# Patient Record
Sex: FEMALE | Race: WHITE | NOT HISPANIC OR LATINO | Employment: FULL TIME | URBAN - METROPOLITAN AREA
[De-identification: names, ages, dates, MRNs, and addresses within clinical notes are randomized per-mention and may not be internally consistent; named-entity substitution may affect disease eponyms.]

---

## 2017-02-17 ENCOUNTER — GENERIC CONVERSION - ENCOUNTER (OUTPATIENT)
Dept: OTHER | Facility: OTHER | Age: 22
End: 2017-02-17

## 2017-03-09 ENCOUNTER — ALLSCRIPTS OFFICE VISIT (OUTPATIENT)
Dept: OTHER | Facility: OTHER | Age: 22
End: 2017-03-09

## 2017-03-09 LAB
BACTERIA UR QL AUTO: NORMAL
CLUE CELL (HISTORICAL): POSITIVE
HYPHAL YEAST (HISTORICAL): NORMAL
KOH PREP (HISTORICAL): NORMAL
PH UR STRIP.AUTO: 5 [PH]
TRICHOMONAS (HISTORICAL): NEGATIVE

## 2017-03-20 ENCOUNTER — GENERIC CONVERSION - ENCOUNTER (OUTPATIENT)
Dept: OTHER | Facility: OTHER | Age: 22
End: 2017-03-20

## 2017-03-29 ENCOUNTER — LAB CONVERSION - ENCOUNTER (OUTPATIENT)
Dept: OTHER | Facility: OTHER | Age: 22
End: 2017-03-29

## 2017-03-29 LAB
DEPRECATED RDW RBC AUTO: 14.1 % (ref 11–15)
HCG QUANTITATIVE (HISTORICAL): <2 MIU/ML
HCT VFR BLD AUTO: 41.7 % (ref 35–45)
HEPATITIS A IGM ANTIBODY (HISTORICAL): NORMAL
HEPATITIS B CORE TOTAL ANTIBODY (HISTORICAL): NORMAL
HEPATITIS B SURFACE ANTIGEN (HISTORICAL): NORMAL
HEPATITIS C ANTIBODY (HISTORICAL): NORMAL
HGB BLD-MCNC: 13.8 G/DL (ref 11.7–15.5)
HIV AG/AB, 4TH GEN (HISTORICAL): NORMAL
MCH RBC QN AUTO: 26.8 PG (ref 27–33)
MCHC RBC AUTO-ENTMCNC: 33.1 G/DL (ref 32–36)
MCV RBC AUTO: 81.1 FL (ref 80–100)
PLATELET # BLD AUTO: 287 THOUSAND/UL (ref 140–400)
PMV BLD AUTO: 8.6 FL (ref 7.5–12.5)
RBC # BLD AUTO: 5.15 MILLION/UL (ref 3.8–5.1)
RPR SCREEN (HISTORICAL): NORMAL
SIGNAL TO CUT-OFF (HISTORICAL): 0.01
TSH SERPL DL<=0.05 MIU/L-ACNC: 0.66 MIU/L
WBC # BLD AUTO: 9.3 THOUSAND/UL (ref 3.8–10.8)

## 2017-04-04 ENCOUNTER — GENERIC CONVERSION - ENCOUNTER (OUTPATIENT)
Dept: OTHER | Facility: OTHER | Age: 22
End: 2017-04-04

## 2017-10-12 ENCOUNTER — ALLSCRIPTS OFFICE VISIT (OUTPATIENT)
Dept: OTHER | Facility: OTHER | Age: 22
End: 2017-10-12

## 2017-10-17 LAB — CLINICAL COMMENT (HISTORICAL): NORMAL

## 2017-11-06 ENCOUNTER — OFFICE VISIT (OUTPATIENT)
Dept: URGENT CARE | Facility: CLINIC | Age: 22
End: 2017-11-06
Payer: COMMERCIAL

## 2017-11-07 NOTE — PROGRESS NOTES
Chief Complaint  Chief Complaint Free Text Note Form: Patient is here for a work physical       History of Present Illness  Hospital Based Practices Required Assessment:   Pain Assessment   the patient states they do not have pain  Abuse And Domestic Violence Screen    Yes, the patient is safe at home  -- The patient states no one is hurting them  Depression And Suicide Screen  No, the patient has not had thoughts of hurting themself  No, the patient has not felt depressed in the past 7 days  Active Problems  1  Cervical cancer screening (V76 2) (Z12 4)   2  Contraception (V25 9) (Z30 9)   3  Encounter for routine gynecological examination with Papanicolaou smear of cervix   (V72 31,V76 2) (Z01 419)   4  Irregular bleeding (626 4) (N92 6)   5  Nexplanon in place (V45 52) (Z97 5)   6  Screening for STDs (sexually transmitted diseases) (V74 5) (Z11 3)    Past Medical History  1  History of Bacterial vaginosis (616 10,041 9) (N76 0,B96 89)   2  History of Birth control counseling (V25 09) (Z30 09)   3  History of Denial (799 29) (R45 89)   4  History of  0 (V49 89)   5  History of streptococcal pharyngitis (V12 09) (Z87 09)   6  History of Low grade squamous intraepithelial lesion (LGSIL) on cervical Pap smear   (795 03) (R87 612)   7  History of Nexplanon insertion (V25 5) (Z30 017)   8  History of Pap smear for cervical cancer screening (V76 2) (Z12 4)   9  History of Potential exposure to STD (V01 6) (Z20 2)   10  History of Pre-employment examination (V70 5) (Z02 1)   11  History of Sore throat (462) (J02 9)   12  History of Vaginal candidiasis (112 1) (B37 3)    Family History  Mother    1  No pertinent family history  Father    2  Family history of cardiac disorder (V17 49) (Z82 49)   3  Family history of malignant neoplasm (V16 9) (Z80 9)   4  Family history of ovarian cyst (V18 7) (Z84 2)  Sister    5  Family history of malignant neoplasm (V16 9) (Z80 9)   6   Family history of ovarian cyst (V18 7) (Z84 2)  Grandparent    7  Family history of malignant neoplasm (V16 9) (Z80 9)    Social History   · Always uses seat belt   · Caffeine use (V49 89) (F15 90)   · Condom use   · Denied: History of Finding of    · Never smoker   · Nexplanon in place (V45 52) (Z97 5)   · No alcohol use   · No drug use   · Single    Surgical History  1  History of Colposcopy Cervix With Biopsy(S) With Endocervical Curettage    Current Meds   1  Nexplanon 68 MG Subcutaneous Implant; Therapy: (Recorded:19Fnf6637) to Recorded    Allergies  1  No Known Drug Allergies  2   Pollen    Vitals  Signs   Recorded: 12VMO7272 12:53PM   Temperature: 99 F  Heart Rate: 98  Respiration: 18  Systolic: 603  Diastolic: 76  Height: 5 ft 3 in  Weight: 204 lb   BMI Calculated: 36 14  BSA Calculated: 1 95  O2 Saturation: 97  Pain Scale: 0    Signatures   Electronically signed by : TOMA Hansen ; 2017 12:56PM EST                       (Author)

## 2018-01-09 NOTE — MISCELLANEOUS
Message   Recorded as Task   Date: 08/26/2016 08:30 AM, Created By: Kevin Cotton   Task Name: Miscellaneous   Assigned To: Yoko Cohen   Regarding Patient: Philippe Lora, Status: Active   Comment:    Mk Bañuelos - 26 Aug 2016 8:30 AM     TASK CREATED  Patient changed her mind and no longer wishes Mirena IUD  She wishes to have Nexplanon for contraception  Please check for insurance company coverage and please arrange for insertion in the near future  The patient is not sexually active, so we could insert Nexplanon at any time  Would check pregnancy test at the time of insertion and encouraged condom use should she become sexually active   [facility] - 26 Aug 2016 9:24 AM     TASK EDITED                 Spoke to patient, she wishes to proceed with Nexplanon placement  Appointment given for 9/14 with Dr Opal Simpson    1  Birth control counseling (V25 09) (Z30 9)   2  Encounter for routine gynecological examination with Papanicolaou smear of cervix   (V72 31,V76 2) (Z01 419)   3  Low grade squamous intraepithelial lesion (LGSIL) on cervical Pap smear (795 03)   (Y05 992)   4  Pap smear for cervical cancer screening (V76 2) (Z12 4)   5  Potential exposure to STD (V01 6) (Z20 2)   6  Pre-employment examination (V70 5) (Z02 1)   7  Sore throat (462) (J02 9)   8  Strep throat (034 0) (J02 0)    Allergies    1  No Known Drug Allergies    2  Pollen    Signatures   Electronically signed by :  Flores Shoemaker, ; Aug 26 2016  9:26AM EST                       (Author)

## 2018-01-10 NOTE — MISCELLANEOUS
Message   Recorded as Task   Date: 03/17/2017 03:29 PM, Created By: Leslie Ayala   Task Name: Miscellaneous   Assigned To: Ankit Almanzar   Regarding Patient: Vandana Flynn, Status: In Progress   SummerNelli Gautam - 17 Mar 2017 3:29 PM     TASK CREATED  GC/Chlamydia negative, please inform the patient  Joanne Jj - 20 Mar 2017 8:43 AM     TASK IN PROGRESS   Joanne Jj - 20 Mar 2017 8:48 AM     TASK EDITED  pt informed        Active Problems    1  Bacterial vaginosis (616 10,041 9) (N76 0,B96 89)   2  Contraception (V25 9) (Z30 9)   3  Irregular bleeding (626 4) (N92 6)   4  Nexplanon in place (V45 52) (Z97 5)   5  Vaginal candidiasis (112 1) (B37 3)    Current Meds   1  Fluconazole 150 MG Oral Tablet; TAKE 1 TABLET Now and 1 tablet in one week; Therapy: 52KQE3958 to (Evaluate:11Mar2017)  Requested for: 03GZE9806; Last   Rx:09Mar2017 Ordered   2  MetroNIDAZOLE 500 MG Oral Tablet; TAKE 1 TABLET TWICE DAILY UNTIL FINISHED; Therapy: 03ZPS7174 to (Evaluate:16Mar2017)  Requested for: 64OLU1114; Last   Rx:09Mar2017 Ordered   3  Nexplanon 68 MG Subcutaneous Implant; Therapy: (Recorded:07Gau6260) to Recorded    Allergies    1  No Known Drug Allergies    2   Pollen    Signatures   Electronically signed by : Fernando Vo, ; Mar 20 2017  8:49AM EST                       (Author)

## 2018-01-10 NOTE — MISCELLANEOUS
Message   Recorded as Task   Date: 08/09/2016 06:54 PM, Created By: Donovan Montoya   Task Name: Go to Result   Assigned To: Sarah Mcconnell   Regarding Patient: Marcelle Sweeney, Status: In Progress   CommentWinston Salguero - 09 Aug 2016 6:54 PM     TASK CREATED  Colposcopy with benign findings, please inform the patient  Recommend return in the near future for Mirena IUD insertion as planned   Joanne Jj - 10 Aug 2016 9:11 AM     TASK IN PROGRESS        Active Problems    1  Birth control counseling (V25 09) (Z30 9)   2  Encounter for routine gynecological examination with Papanicolaou smear of cervix   (V72 31,V76 2) (Z01 419)   3  Low grade squamous intraepithelial lesion (LGSIL) on cervical Pap smear (795 03)   (E45 104)   4  Pap smear for cervical cancer screening (V76 2) (Z12 4)   5  Potential exposure to STD (V01 6) (Z20 2)   6  Pre-employment examination (V70 5) (Z02 1)   7  Sore throat (462) (J02 9)   8  Strep throat (034 0) (J02 0)    Allergies    1  No Known Drug Allergies    2   Pollen    Signatures   Electronically signed by : Johnnie Woodson, ; Aug 10 2016  9:18AM EST                       (Author)

## 2018-01-10 NOTE — MISCELLANEOUS
Message   Recorded as Task   Date: 08/22/2016 02:41 PM, Created By: [facility] [facility]   Task Name: Medical Complaint Callback   Assigned To: [facility]   Regarding Patient: Sandre Gaucher, Status: Active   CommentTracenghia Carroll - 22 Aug 2016 2:41 PM     TASK CREATED  Patient called she wishes to proceed with iud insertion  SHe just got her menses today  Appointment given for this friday with Dr Raisa Delong    1  Birth control counseling (V25 09) (Z30 9)   2  Encounter for routine gynecological examination with Papanicolaou smear of cervix   (V72 31,V76 2) (Z01 419)   3  Low grade squamous intraepithelial lesion (LGSIL) on cervical Pap smear (795 03)   (D63 509)   4  Pap smear for cervical cancer screening (V76 2) (Z12 4)   5  Potential exposure to STD (V01 6) (Z20 2)   6  Pre-employment examination (V70 5) (Z02 1)   7  Sore throat (462) (J02 9)   8  Strep throat (034 0) (J02 0)    Allergies    1  No Known Drug Allergies    2  Pollen    Signatures   Electronically signed by :  Mandeep Morel, ; Aug 22 2016  2:41PM EST                       (Author)

## 2018-01-12 NOTE — MISCELLANEOUS
Message   Date: 17 Feb 2017 9:32 AM EST, Recorded By: Jose Daniel Robison For: Vicky Kendall   Caller: Amado Caballero, Self   Phone: [phone] (Home)   Reason: Medical Complaint   pt is having issues with her nexplanon    she has been having brownish discharge for the past 3 months    pt may want it removed    pt will schedule appt           Active Problems    1  Birth control counseling (V25 09) (Z30 9)   2  Contraception (V25 9) (Z30 9)   3  Encounter for routine gynecological examination with Papanicolaou smear of cervix   (V72 31,V76 2) (Z01 419)   4  Irregular bleeding (626 4) (N92 6)   5  Low grade squamous intraepithelial lesion (LGSIL) on cervical Pap smear (795 03)   (F38 119)   6  Nexplanon in place (V45 52) (Z97 5)   7  Pap smear for cervical cancer screening (V76 2) (Z12 4)   8  Potential exposure to STD (V01 6) (Z20 2)   9  Pre-employment examination (V70 5) (Z02 1)   10  Sore throat (462) (J02 9)   11  Strep throat (034 0) (J02 0)    Current Meds   1  Nexplanon 68 MG Subcutaneous Implant; Therapy: (Recorded:84Fef4151) to Recorded    Allergies    1  No Known Drug Allergies    2   Pollen    Signatures   Electronically signed by : Gamaliel Rogel, ; Feb 17 2017  9:35AM EST                       (Author)

## 2018-01-13 VITALS
WEIGHT: 196.25 LBS | BODY MASS INDEX: 34.77 KG/M2 | DIASTOLIC BLOOD PRESSURE: 78 MMHG | HEIGHT: 63 IN | SYSTOLIC BLOOD PRESSURE: 118 MMHG

## 2018-01-13 NOTE — PROGRESS NOTES
Assessment    1  Contraception (V25 9) (Z30 9)   2  Irregular bleeding (626 4) (N92 6)   3  Bacterial vaginosis (616 10,041 9) (N76 0,B96 89)   4  Vaginal candidiasis (112 1) (B37 3)   5  Nexplanon in place (V45 52) (Z97 5)    Plan  Bacterial vaginosis    · MetroNIDAZOLE 500 MG Oral Tablet; TAKE 1 TABLET TWICE DAILY UNTIL  FINISHED   Rx By: Jose Enrique Schreiber; Dispense: 7 Days ; #:14 Tablet; Refill: 0; For: Bacterial vaginosis; WARD = N; [facility] [employer] [place]   · [address] [facility]; Status:Complete;   Done: 09GHC9180 03:25PM   Performed: In Office; M:53YXC8906;[facility]; Today; For:Bacterial vaginosis; Ordered By:Chanda Bañuelos [place]; Contraception, Irregular bleeding    · (1) CBC/ PLT (NO DIFF); Status:Active; Requested PAN:27BZU9305;    Perform:[facility]; BTQ:69GBY6273; Ordered; For:Contraception, Irregular bleeding; Ordered By:Mk Bañuelos;   · (1) HCG QUANT; Status:Active; Requested JQS:36MVE1253;    Perform:[facility]; TUC:36YMO6629; Ordered; For:Contraception, Irregular bleeding; Ordered By:Mk Bañuelos;   · (1) TSH; Status:Active; Requested KUR:09YMT5242;    Perform:[facility]; KCT:10BWK1371; Ordered; For:Contraception, Irregular bleeding; Ordered By:Mk Bañuelos;   · Follow-up visit in 4 Months Evaluation and Treatment  Follow-up  Status: Hold For -  Scheduling  Requested for: 48CDQ6597   Ordered; For: Contraception, Irregular bleeding; Ordered By: Jose Enrique Schreiber Performed:  Due: 63GRI0128  Vaginal candidiasis    · Fluconazole 150 MG Oral Tablet; TAKE 1 TABLET Now and 1 tablet in one week   Rx By: Jose Enrique Schreiber; Dispense: 2 Days ; #:2 Tablet; Refill: 0; For: Vaginal candidiasis; WARD = N; Sent To: [facility]    Discussion/Summary  Discussion Summary:   1  Nexplanon -in good position in the left arm  2  Irregular bleeding-this could be related to Nexplanon insertion, which was placed 9/14/16  The patient has noted almost daily dark brownish/red discharge  Last week, she had normal menstrual flow   Laboratory sheet for TSH, CBC, and hCG was given  3  Bacterial vaginosis with rare yeast-noted on exam today  This could also be contributing to the bleeding  Treatment options were reviewed  Electronic prescription for metronidazole 500 mg tablets twice a day for 7 days was sent to local pharmacy along with fluconazole 150 mg by mouth Ã1 with repeat in 1 week time  She was encouraged to avoid alcohol use during this time  4  Pap with low-grade RANGEL-patient previously counseled about the findings  Colposcopy was previously done with minimal changes noted at 10:00 and 2:00 with biopsies done at these locations along with ECC  The transformation zone was extending into the endocervix, somewhat limiting visualization  The biopsies were negative, however limited specimen was noted at each  No intervention is recommended and the patient will follow-up in July 2017  5  STD concerns-patient broke up with her partner November 2016  Most recent GC/Chlamydia was from July 2016 which was negative  Patient requested testing again and it was done for GC/Chlamydia  We will re-printed the labs for HIV, RPR, and hepatitis panel as she never had them done  7 Gardisil-previously vaccinated  6  Other-patient counseled about potential long-term bleeding related to Nexplanon  We did briefly discuss other options including antibiotics or estrogen or low-dose OCP  Would strongly encourage her to keep the Nexplanon in and be patient  We will see if the bleeding improves with BV/yeast treatment as noted above  This is a visit a greater than 25 minutes duration, with greater than 50% time spent counseling according care  Otherwise she will follow-up in July 2017 for yearly exam       Chief Complaint    1  Vaginal Discharge  Chief Complaint Free Text Note Form: BLEEDING ON AND OFF      History of Present Illness  HPI: The patient was seen today for follow-up visit  Please see assessment and plan for details        Review of Systems  Focused-Female: Constitutional: No fever, no chills, feels well, no tiredness, no recent weight gain or loss  ENT: no ear ache, no loss of hearing, no nosebleeds or nasal discharge, no sore throat or hoarseness  Cardiovascular: no complaints of slow or fast heart rate, no chest pain, no palpitations, no leg claudication or lower extremity edema  Respiratory: no complaints of shortness of breath, no wheezing, no dyspnea on exertion, no orthopnea or PND  Breasts: no complaints of breast pain, breast lump or nipple discharge  Gastrointestinal: no complaints of abdominal pain, no constipation, no nausea or diarrhea, no vomiting, no bloody stools  Genitourinary: vaginal discharge and unexplained vaginal bleeding, but as noted in HPI  Musculoskeletal: no complaints of arthralgia, no myalgia, no joint swelling or stiffness, no limb pain or swelling  Integumentary: no complaints of skin rash or lesion, no itching or dry skin, no skin wounds  Neurological: no complaints of headache, no confusion, no numbness or tingling, no dizziness or fainting  ROS Reviewed:   ROS reviewed  Active Problems    1  Contraception (V25 9) (Z30 9)    Past Medical History    1  History of Birth control counseling (V25 09) (Z30 09)   2  History of Denial (799 29) (R45 89)   3  History of Encounter for routine gynecological examination with Papanicolaou smear of   cervix (V72 31,V76 2) (Z01 419)   4  History of  0 (V49 89) (Z78 9)   5  History of streptococcal pharyngitis (V12 09) (Z87 09)   6  History of Low grade squamous intraepithelial lesion (LGSIL) on cervical Pap smear   (795 03) (R87 612)   7  History of Nexplanon insertion (V25 5) (Z30 017)   8  History of Pap smear for cervical cancer screening (V76 2) (Z12 4)   9  History of Potential exposure to STD (V01 6) (Z20 2)   10  History of Pre-employment examination (V70 5) (Z02 1)   11   History of Sore throat (462) (J02 9)  Active Problems And Past Medical History Reviewed: The active problems and past medical history were reviewed and updated today  Surgical History    1  History of Colposcopy Cervix With Biopsy(S) With Endocervical Curettage  Surgical History Reviewed: The surgical history was reviewed and updated today  Family History  Mother    1  No pertinent family history  Father    2  Family history of cardiac disorder (V17 49) (Z82 49)   3  Family history of malignant neoplasm (V16 9) (Z80 9)   4  Family history of ovarian cyst (V18 7) (Z84 2)  Sister    5  Family history of malignant neoplasm (V16 9) (Z80 9)   6  Family history of ovarian cyst (V18 7) (Z84 2)  Grandparent    7  Family history of malignant neoplasm (V16 9) (Z80 9)  Family History Reviewed: The family history was reviewed and updated today  Social History    · Always uses seat belt   · Caffeine use (V49 89) (F15 90)   · Condom use   · Denied: History of Finding of    · Never smoker   · Nexplanon in place (V45 52) (Z97 5)   · No alcohol use   · No drug use   · Single  Social History Reviewed: The social history was reviewed and updated today  The social history was reviewed and is unchanged  Current Meds   1  Nexplanon 68 MG Subcutaneous Implant; Therapy: (Recorded:86Qoy6566) to Recorded  Medication List Reviewed: The medication list was reviewed and updated today  Allergies    1  No Known Drug Allergies    2  Pollen    Vitals  Vital Signs    Recorded: 52ZSL2603 84:10YQ   Systolic 937, RUE, Sitting   Diastolic 78, RUE, Sitting   Height 5 ft 3 in   Weight 196 lb 4 oz   BMI Calculated 34 76   BSA Calculated 1 92   LMP BLEEDING ON AND OFF     Physical Exam    Constitutional   General appearance: No acute distress, well appearing and well nourished  Genitourinary   External genitalia: Normal and no lesions appreciated  Vagina: Abnormal   Small amount of brown discharge, without erythema or lesions     Urethra: Normal     Urethral meatus: Normal     Bladder: Normal, soft, non-tender and no prolapse or masses appreciated  Cervix: Abnormal   Small brown-red discharge, without lesions or cervicitis  No CMT  Uterus: Normal, non-tender, not enlarged, and no palpable masses  Mid position, normal size, nontender, without mass  Adnexa/parametria: Normal, non-tender and no fullness or masses appreciated  Abdomen   Abdomen: Normal, non-tender, and no organomegaly noted  Liver and spleen: No hepatomegaly or splenomegaly  Examination for hernias: No hernias appreciated      Psychiatric   Orientation to person, place, and time: Normal     Mood and affect: Normal        Signatures   Electronically signed by : TOMA Isaacs ; Mar  9 2017  3:31PM EST                       (Author)

## 2018-01-13 NOTE — MISCELLANEOUS
Message   Recorded as Task   Date: 03/31/2017 05:09 PM, Created By: Megan Navas   Task Name: Miscellaneous   Assigned To: Brandi Rivas   Regarding Patient: Megan Hernandez, Status: Active   Analia Summers - 31 Mar 2017 5:09 PM     TASK CREATED  all labs normal   Please inform pt  Joanne Jj - 04 Apr 2017 8:11 AM     TASK EDITED  pt informed        Active Problems    1  Bacterial vaginosis (616 10,041 9) (N76 0,B96 89)   2  Contraception (V25 9) (Z30 9)   3  Irregular bleeding (626 4) (N92 6)   4  Nexplanon in place (V45 52) (Z97 5)   5  Vaginal candidiasis (112 1) (B37 3)    Current Meds   1  Fluconazole 150 MG Oral Tablet; TAKE 1 TABLET Now and 1 tablet in one week; Therapy: 41BPW5020 to (Evaluate:11Mar2017)  Requested for: 40DNN3193; Last   Rx:09Mar2017 Ordered   2  MetroNIDAZOLE 500 MG Oral Tablet; TAKE 1 TABLET TWICE DAILY UNTIL FINISHED; Therapy: 11WFI0104 to (Evaluate:16Mar2017)  Requested for: 61EBC8244; Last   Rx:09Mar2017 Ordered   3  Nexplanon 68 MG Subcutaneous Implant; Therapy: (Recorded:02Exv7925) to Recorded    Allergies    1  No Known Drug Allergies    2   Pollen    Signatures   Electronically signed by : pAril Lang, ; Apr 4 2017  8:11AM EST                       (Author)

## 2018-01-14 VITALS
SYSTOLIC BLOOD PRESSURE: 118 MMHG | BODY MASS INDEX: 36.7 KG/M2 | HEIGHT: 63 IN | WEIGHT: 207.13 LBS | DIASTOLIC BLOOD PRESSURE: 78 MMHG

## 2018-01-15 NOTE — MISCELLANEOUS
Message   Recorded as Task   Date: 07/01/2016 02:07 PM, Created By: Mony Goldsmith   Task Name: Miscellaneous   Assigned To: [facility]   Regarding Patient: Marni Degroot, Status: Active   CommentJanett Enter - 01 Jul 2016 2:07 PM     TASK CREATED  Patient interested in [place] [address] IUD  Please check for insurance company coverage and arrange for insertion in the near future if covered   [facility] - 08 Jul 2016 1:16 PM     TASK EDITED  Spoke to Francisca Ramirez at [facility], Mirena iud is covered at 100% under plan  No ded or oop  [facility] [place] - 08 Jul 2016 1:19 PM     TASK EDITED  Patient aware  She wishes to proceed with iud insertion with next menses  SHe is set for a colpo with Dr Dhruv Diazer  SHe will discuss when to set up iud appt at that visit        Active Problems    1  Birth control counseling (V25 09) (Z30 9)   2  Encounter for routine gynecological examination with Papanicolaou smear of cervix   (V72 31,V76 2) (Z01 419)   3  Pap smear for cervical cancer screening (V76 2) (Z12 4)   4  Potential exposure to STD (V01 6) (Z20 2)   5  Pre-employment examination (V70 5) (Z02 1)   6  Sore throat (462) (J02 9)   7  Strep throat (034 0) (J02 0)   8  Vaginal candidiasis (112 1) (B37 3)    Current Meds   1  Amoxicillin 500 MG Oral Tablet; Therapy: 45SXN7462 to Recorded   2  Fluconazole 150 MG Oral Tablet; TAKE 1 TABLET ONCE  MAY REPEAT IN 1 WEEK IF   STILL SYMPTOMATIC; Therapy: 16DXB0417 to (Evaluate:94Ynu7292)  Requested for: 70RPS3791; Last   Rx:32Rpn2478 Ordered    Allergies    1  No Known Drug Allergies    2  Pollen    Signatures   Electronically signed by :  Letitia Hashimoto, ; Jul 8 2016  1:19PM EST                       (Author)

## 2018-01-16 NOTE — MISCELLANEOUS
Message   Recorded as Task   Date: 07/08/2016 08:01 AM, Created By: Eze Vega   Task Name: Go to Result   Assigned To: Maren Wong   Regarding Patient: Tiffany Madrid, Status: In Progress   CommentCurtistine Estimable - 08 Jul 2016 8:01 AM     TASK CREATED  Pap with low-grade RANGEL, recommend colposcopy   Casandra Flood - 08 Jul 2016 11:16 AM     TASK IN PROGRESS    Pt informed  Active Problems    1  Birth control counseling (V25 09) (Z30 9)   2  Encounter for routine gynecological examination with Papanicolaou smear of cervix   (V72 31,V76 2) (Z01 419)   3  Pap smear for cervical cancer screening (V76 2) (Z12 4)   4  Potential exposure to STD (V01 6) (Z20 2)   5  Pre-employment examination (V70 5) (Z02 1)   6  Sore throat (462) (J02 9)   7  Strep throat (034 0) (J02 0)   8  Vaginal candidiasis (112 1) (B37 3)    Current Meds   1  Amoxicillin 500 MG Oral Tablet; Therapy: 79GAO7053 to Recorded   2  Fluconazole 150 MG Oral Tablet; TAKE 1 TABLET ONCE  MAY REPEAT IN 1 WEEK IF   STILL SYMPTOMATIC; Therapy: 89HLP2034 to (Evaluate:38Qxk0497)  Requested for: 67WRE6341; Last   Rx:[id number] Ordered    Allergies    1  No Known Drug Allergies    2   Pollen    Signatures   Electronically signed by : Lon Jean Baptiste, ; Jul 8 2016  1:14PM EST                       (Author)

## 2018-05-17 ENCOUNTER — ANNUAL EXAM (OUTPATIENT)
Dept: OBGYN CLINIC | Facility: CLINIC | Age: 23
End: 2018-05-17
Payer: COMMERCIAL

## 2018-05-17 VITALS
HEIGHT: 63 IN | WEIGHT: 205.4 LBS | BODY MASS INDEX: 36.39 KG/M2 | SYSTOLIC BLOOD PRESSURE: 122 MMHG | DIASTOLIC BLOOD PRESSURE: 76 MMHG

## 2018-05-17 DIAGNOSIS — Z11.3 SCREENING FOR STD (SEXUALLY TRANSMITTED DISEASE): Primary | ICD-10-CM

## 2018-05-17 DIAGNOSIS — Z12.4 CERVICAL CANCER SCREENING: ICD-10-CM

## 2018-05-17 DIAGNOSIS — Z01.419 WOMEN'S ANNUAL ROUTINE GYNECOLOGICAL EXAMINATION: ICD-10-CM

## 2018-05-17 DIAGNOSIS — N92.6 IRREGULAR BLEEDING: ICD-10-CM

## 2018-05-17 PROCEDURE — G0145 SCR C/V CYTO,THINLAYER,RESCR: HCPCS | Performed by: OBSTETRICS & GYNECOLOGY

## 2018-05-17 PROCEDURE — 87591 N.GONORRHOEAE DNA AMP PROB: CPT | Performed by: OBSTETRICS & GYNECOLOGY

## 2018-05-17 PROCEDURE — 99395 PREV VISIT EST AGE 18-39: CPT | Performed by: OBSTETRICS & GYNECOLOGY

## 2018-05-17 PROCEDURE — 87491 CHLMYD TRACH DNA AMP PROBE: CPT | Performed by: OBSTETRICS & GYNECOLOGY

## 2018-05-17 NOTE — PROGRESS NOTES
Assessment/Plan:  1  Yearly exam-Pap smear done with reflex HPV with patient request, self-breast awareness reviewed  2  Nexplanon-in good position in the left arm  It was placed 9/14/16  3   Irregular bleeding-patient mostly has light bleeding/spotting at monthly intervals  She did note about 2 weeks of light bleeding this past month that ended a few days ago  She denies any menometrorrhagia or oligomenorrhea/amenorrhea  She will call with any menometrorrhagia symptoms  4   History of BV/yeast-none noted today  5  History of Pap with low-grade RANGEL-previous colposcopy done August 2016 was without dysplasia  Pap with reflex was done today at patient request   6   STD concerns-patient was recently with a partner who she now thinks may be in a same-sex relationship  GC/chlamydia was done today with Pap test   Laboratory sheet for HIV, RPR, and hepatitis panel was given  7  Other-patient did have last yearly exam 10/12/17  She was aware that insurance company may not cover for yearly exam at this time, nor repeat Pap smear at this short interval   She states that her mother is moving and she may be without insurance soon so she wished to proceed with this testing  Otherwise, she will follow up in 1 year or as needed  No problem-specific Assessment & Plan notes found for this encounter  Diagnoses and all orders for this visit:    Screening for STD (sexually transmitted disease)  -     Chlamydia/GC amplified DNA by PCR  -     HIV 1/2 AG-AB combo; Future  -     RPR; Future  -     Hepatitis panel, acute; Future    Cervical cancer screening  -     Liquid-based pap, screening    Irregular bleeding    Women's annual routine gynecological examination    Other orders  -     Etonogestrel (Rayfield Reagin) 68 MG IMPL; Inject under the skin          Subjective:      Patient ID: Seb Balbuena is a 23 y o  female  Patient was seen today for yearly exam   Please see assessment plan for details          The following portions of the patient's history were reviewed and updated as appropriate: allergies, current medications, past family history, past medical history, past social history, past surgical history and problem list     Review of Systems   Constitutional: Negative for chills, diaphoresis, fatigue and fever  Respiratory: Negative for apnea, cough, chest tightness, shortness of breath and wheezing  Cardiovascular: Negative for chest pain, palpitations and leg swelling  Gastrointestinal: Negative for abdominal distention, abdominal pain, anal bleeding, constipation, diarrhea, nausea, rectal pain and vomiting  Genitourinary: Positive for menstrual problem  Negative for difficulty urinating, dyspareunia, dysuria, frequency, hematuria, pelvic pain, urgency, vaginal bleeding, vaginal discharge and vaginal pain  Musculoskeletal: Negative for arthralgias, back pain and myalgias  Skin: Negative for color change and rash  Neurological: Negative for dizziness, syncope, light-headedness, numbness and headaches  Hematological: Negative for adenopathy  Does not bruise/bleed easily  Psychiatric/Behavioral: Negative for dysphoric mood and sleep disturbance  The patient is not nervous/anxious            Objective:      /76 (BP Location: Right arm, Patient Position: Sitting, Cuff Size: Standard)   Ht 5' 2 5" (1 588 m)   Wt 93 2 kg (205 lb 6 4 oz)   LMP 04/26/2018 (Exact Date)   BMI 36 97 kg/m²          Physical Exam    Objective      /76 (BP Location: Right arm, Patient Position: Sitting, Cuff Size: Standard)   Ht 5' 2 5" (1 588 m)   Wt 93 2 kg (205 lb 6 4 oz)   LMP 04/26/2018 (Exact Date)   BMI 36 97 kg/m²     General:   alert and oriented, in no acute distress, alert, appears stated age and cooperative   Neck: normal to inspection and palpation   Breast: normal appearance, no masses or tenderness   Heart:    Lungs:    Abdomen: soft, non-tender, without masses or organomegaly   Vulva: normal   Vagina: Without erythema or lesions or discharge  Normal   Cervix: Without lesions or discharge or cervicitis    No Cervical motion tenderness and normal   Uterus: normal size, anteverted, non-tender   Adnexa: no mass, fullness, tenderness   Rectum: negative

## 2018-05-17 NOTE — PATIENT INSTRUCTIONS
Etonogestrel (Implant)   Etonogestrel (e-toe-rosemarie-ANAHI-trel)  Prevents pregnancy  Brand Name(s): Nexplanon   There may be other brand names for this medicine  When This Medicine Should Not Be Used: This medicine is not right for everyone  You should not receive it if you had an allergic reaction to etonogestrel, or if you are pregnant  Do not use it if you have breast cancer, heart disease, liver disease, or a history of blood clots (such as deep vein thrombosis, pulmonary embolism, or stroke)  How to Use This Medicine:   Implant  · A nurse or other trained health professional will give you this medicine  · This medicine is an implant  It will be surgically placed under the skin of your upper, inner arm  · Gently press your fingertips over the skin where this medicine was inserted  You should be able to feel the implant  · You might have to use another form of birth control for 7 days after the implant is inserted  Your doctor will tell you if this is needed  · Your doctor can remove the implant at any time if you want to stop using this medicine  The implant must be removed after 3 years, but you may have a new one inserted if you still want to use this form of birth control  · Read and follow the patient instructions that come with this medicine  Talk to your doctor or pharmacist if you have any questions  Drugs and Foods to Avoid:   Ask your doctor or pharmacist before using any other medicine, including over-the-counter medicines, vitamins, and herbal products  · Some foods and medicines can affect how etonogestrel works  Tell your doctor if you are using any of the following:  ¨ Bosentan, carbamazepine, cyclosporine, felbamate, griseofulvin, itraconazole, ketoconazole, lamotrigine, oxcarbazepine, phenobarbital, phenytoin, rifampin, Armando wort, topiramate  ¨ Medicine for HIV/AIDS  Warnings While Using This Medicine:   · Tell your doctor right away if you think you become pregnant   The implant will need to be removed  · Tell your doctor if you have cancer, blood circulation problems, high blood pressure, or kidney disease, or if you smoke  Tell your doctor if you are breastfeeding, or if you have recently given birth  Also tell your doctor if you have diabetes or prediabetes, high cholesterol, or a history of depression  · This medicine may cause the following problems:  ¨ Ectopic (tubal) pregnancy  ¨ Cysts in the ovaries  ¨ Possible risk of breast cancer  ¨ Higher risk of heart attack, stroke, or blood clots  ¨ Liver cancers or tumors  ¨ High blood pressure  · This medicine may change your usual menstrual cycle  You might have irregular bleeding, or your periods may be lighter, shorter, heavier, or longer  You might not have a period in some cycles  However, call your doctor if you think you are pregnant or if you have severe pain or changes that worry you  · This medicine will not protect you from HIV/AIDS or other sexually transmitted diseases  · You might need to have the implant removed if you will be inactive for a period of time, such as after major surgery, because of the risk of blood clots  · Tell any doctor or dentist who treats you that you are using this medicine  This medicine may affect certain medical test results  · Your doctor will check your progress and the effects of this medicine at regular visits  Keep all appointments  · Keep all medicine out of the reach of children  Never share your medicine with anyone    Possible Side Effects While Using This Medicine:   Call your doctor right away if you notice any of these side effects:  · Allergic reaction: Itching or hives, swelling in your face or hands, swelling or tingling in your mouth or throat, chest tightness, trouble breathing  · Chest pain, trouble breathing, or coughing up blood  · Dark urine or pale stools, nausea, vomiting, loss of appetite, stomach pain, yellow skin or eyes  · Double vision or other trouble seeing  · Numbness or weakness on one side of your body, sudden or severe headache, problems with vision, speech, or walking  · Pain in your lower leg (calf)  · Severe or ongoing pain, tingling, bleeding, bruising, redness, itching, or swelling where the implant is placed  · Unusual or severe pain in your abdomen  · Unusual or unexpected vaginal bleeding or heavy bleeding  If you notice these less serious side effects, talk with your doctor:   · Acne or pimples  · Mild headache  · Mild pain, tingling, bleeding, bruising, redness, itching, or swelling where the implant is placed  · Mood changes  · Weight gain  If you notice other side effects that you think are caused by this medicine, tell your doctor  Call your doctor for medical advice about side effects  You may report side effects to FDA at 0-075-FDA-0140  © 2017 [employer] Information is for End User's use only and may not be sold, redistributed or otherwise used for commercial purposes  The above information is an  only  It is not intended as medical advice for individual conditions or treatments  Talk to your doctor, nurse or pharmacist before following any medical regimen to see if it is safe and effective for you

## 2018-05-18 LAB
CHLAMYDIA DNA CVX QL NAA+PROBE: NORMAL
N GONORRHOEA DNA GENITAL QL NAA+PROBE: NORMAL

## 2018-05-21 ENCOUNTER — TELEPHONE (OUTPATIENT)
Dept: OBGYN CLINIC | Facility: CLINIC | Age: 23
End: 2018-05-21

## 2018-05-21 NOTE — TELEPHONE ENCOUNTER
----- Message from Enrique Smith MD sent at 5/20/2018  2:08 PM EDT -----  GC/chlamydia negative, please inform the patient  Pap smear is still pending

## 2018-05-22 LAB
LAB AP GYN PRIMARY INTERPRETATION: NORMAL
LAB AP LMP: NORMAL
Lab: NORMAL
PATH INTERP SPEC-IMP: NORMAL

## 2018-05-23 ENCOUNTER — TELEPHONE (OUTPATIENT)
Dept: OBGYN CLINIC | Facility: CLINIC | Age: 23
End: 2018-05-23

## 2018-05-29 LAB
HAV IGM SERPL QL IA: NORMAL
HBV CORE IGM SERPL QL IA: NORMAL
HBV SURFACE AG SERPL QL IA: NORMAL
HCV AB S/CO SERPL IA: 0.01
HCV AB SERPL QL IA: NORMAL
HIV 1+2 AB+HIV1 P24 AG SERPL QL IA: NORMAL
RPR SER QL: NORMAL

## 2019-10-02 ENCOUNTER — DOCUMENTATION (OUTPATIENT)
Dept: OBGYN CLINIC | Facility: CLINIC | Age: 24
End: 2019-10-02

## 2019-10-02 NOTE — PROGRESS NOTES
Patient will be dropping off updated insurance cards to be precerted for nexplanon removal and insertion

## 2019-10-10 ENCOUNTER — TELEPHONE (OUTPATIENT)
Dept: OBGYN CLINIC | Facility: CLINIC | Age: 24
End: 2019-10-10

## 2019-10-10 NOTE — PROGRESS NOTES
Patient is covered 100%, zero copay for Nexplanon removel and insertion  Ref #[id number] spoke to [place]  Called patient to schedule removel and insertion, left message for patient to call back

## 2019-10-10 NOTE — PROGRESS NOTES
Patient stated she was unable to drop off her insurance information and stated she mailed copies but we have yet to receive them  Patient's id # is [id number]  Phone number to do the cert is [phone] for nexplanon removal and reinsert

## 2019-10-25 ENCOUNTER — PROCEDURE VISIT (OUTPATIENT)
Dept: OBGYN CLINIC | Facility: CLINIC | Age: 24
End: 2019-10-25
Payer: COMMERCIAL

## 2019-10-25 VITALS
BODY MASS INDEX: 39.69 KG/M2 | SYSTOLIC BLOOD PRESSURE: 126 MMHG | WEIGHT: 224 LBS | HEIGHT: 63 IN | DIASTOLIC BLOOD PRESSURE: 78 MMHG

## 2019-10-25 DIAGNOSIS — Z30.46 ENCOUNTER FOR NEXPLANON REMOVAL: ICD-10-CM

## 2019-10-25 DIAGNOSIS — Z30.46 ENCOUNTER FOR SURVEILLANCE OF NEXPLANON SUBDERMAL CONTRACEPTIVE: ICD-10-CM

## 2019-10-25 DIAGNOSIS — Z30.017 NEXPLANON INSERTION: Primary | ICD-10-CM

## 2019-10-25 DIAGNOSIS — N92.6 IRREGULAR BLEEDING: ICD-10-CM

## 2019-10-25 LAB — SL AMB POCT URINE HCG: NEGATIVE

## 2019-10-25 PROCEDURE — 81025 URINE PREGNANCY TEST: CPT | Performed by: OBSTETRICS & GYNECOLOGY

## 2019-10-25 PROCEDURE — 11983 REMOVE/INSERT DRUG IMPLANT: CPT | Performed by: OBSTETRICS & GYNECOLOGY

## 2019-10-25 NOTE — PROGRESS NOTES
Remove and insert drug implant  Date/Time: 10/25/2019 11:42 AM  Performed by: Irineo Carvalho MD  Authorized by: Irineo Carvalho MD     Consent:     Consent obtained:  Verbal and written    Consent given by:  Patient    Procedural risks discussed:  Bleeding, damage to other organs, failure rate, infection and possible continued pain    Patient questions answered: yes      Patient agrees, verbalizes understanding, and wants to proceed: yes      Educational handouts given: yes      Instructions and paperwork completed: yes    Indication:     Indication: Presence of non-biodegradable drug delivery implant    Pre-procedure:     Pre-procedure timeout performed: yes      Prepped with: povidone-iodine      The site was cleaned and prepped in a sterile fashion: yes    Procedure:     Procedure:  Removal with reinsertion    Small stab incision was made in arm: yes      Left/right:  Left    Preloaded contraceptive capsule trocar was placed subdermally: yes      Visualization of implant was obtained: yes      Contraceptive capsule was inserted and trocar removed: yes      Visualization of notch in stylet and palpation of device: yes      Palpation confirms placement by provider and patient: yes      Site was closed with steri-strips and pressure bandage applied: yes    Comments:      Nexplanon was removed from the prior site at the groove between the biceps/triceps  Small 1 cm incision was made and the device was removed in approximately 2 minutes with grasper  The new Nexplanon was then placed 2 cm posteriorly as recommended  Patient tolerated the procedure well  Steri-Strips followed by pressure bandage was placed over both sites  Light activity was recommended over the next few days  She will call with any signs or symptoms of infection  She has follow-up yearly exam in about 3 weeks time we will re-evaluate her at that time

## 2019-10-25 NOTE — PROGRESS NOTES
Assessment/Plan   Diagnoses and all orders for this visit:    Encounter for surveillance of Nexplanon subdermal contraceptive  -     POCT urine HCG  -     Remove and insert drug implant    Irregular bleeding    Encounter for Nexplanon removal    Nexplanon insertion     1  Nexplanon removal with reinsertion-done without problems today  Patient tolerated the procedure well  The initial Nexplanon was removed from its location in the groove between the biceps/triceps  The new Nexplanon was placed about 2 cm posteriorly as per current recommendations  Patient tolerated procedure well  She will watch for any signs or symptoms of infection  She will follow-up in 3 weeks time with yearly exam for incision check  2  History of irregular bleeding-patient did have some erratically bleeding over the past few months  Likely, this is related to Nexplanon  She will follow this and call with any menometrorrhagia  3  Previous Pap with low-grade RANGEL-most recent Pap May 2018 was normal   4  History BV/yeast/STD concerns-to be addressed at follow-up visit  Subjective   Patient ID: Monica Booker is a 20 y o  female  Vitals:    10/25/19 1123   BP: 126/78     Patient was seen today for Nexplanon removal with reinsertion  The following portions of the patient's history were reviewed and updated as appropriate: allergies, current medications, past family history, past medical history, past social history, past surgical history and problem list   No past medical history on file  No past surgical history on file    OB History    Para Term  AB Living   0 0 0 0 0 0   SAB TAB Ectopic Multiple Live Births   0 0 0 0 0       Current Outpatient Medications:     Etonogestrel (NEXPLANON) 76 MG IMPL, Inject under the skin, Disp: , Rfl:   Allergies   Allergen Reactions    Other      Social History     Socioeconomic History    Marital status: Single     Spouse name: None    Number of children: None    Years of education: None    Highest education level: None   Occupational History    None   Social Needs    Financial resource strain: None    Food insecurity:     Worry: None     Inability: None    Transportation needs:     Medical: None     Non-medical: None   Tobacco Use    Smoking status: Never Smoker    Smokeless tobacco: Never Used   Substance and Sexual Activity    Alcohol use: Yes     Comment: social     Drug use: No    Sexual activity: None   Lifestyle    Physical activity:     Days per week: None     Minutes per session: None    Stress: None   Relationships    Social connections:     Talks on phone: None     Gets together: None     Attends Shinto service: None     Active member of club or organization: None     Attends meetings of clubs or organizations: None     Relationship status: None    Intimate partner violence:     Fear of current or ex partner: None     Emotionally abused: None     Physically abused: None     Forced sexual activity: None   Other Topics Concern    None   Social History Narrative    None     Family History   Problem Relation Age of Onset    No Known Problems Mother     Heart disease Father        Review of Systems   Constitutional: Negative for chills, diaphoresis, fatigue and fever  Respiratory: Negative for apnea, cough, chest tightness, shortness of breath and wheezing  Cardiovascular: Negative for chest pain, palpitations and leg swelling  Gastrointestinal: Negative for abdominal distention, abdominal pain, anal bleeding, constipation, diarrhea, nausea, rectal pain and vomiting  Genitourinary: Negative for difficulty urinating, dyspareunia, dysuria, frequency, hematuria, menstrual problem, pelvic pain, urgency, vaginal bleeding, vaginal discharge and vaginal pain  Musculoskeletal: Negative for arthralgias, back pain and myalgias  Skin: Negative for color change and rash     Neurological: Negative for dizziness, syncope, light-headedness, numbness and headaches  Hematological: Negative for adenopathy  Does not bruise/bleed easily  Psychiatric/Behavioral: Negative for dysphoric mood and sleep disturbance  The patient is not nervous/anxious  Objective   Physical Exam    Objective      /78 (BP Location: Left arm, Patient Position: Sitting, Cuff Size: Large)   Ht 5' 3" (1 6 m)   Wt 102 kg (224 lb)   LMP 10/17/2019 (Exact Date)   BMI 39 68 kg/m²     General:   alert and oriented, in no acute distress   Neck:    Breast:    Heart:    Lungs:    Abdomen: soft, non-tender, without masses or organomegaly   Vulva:    Vagina:    Cervix:    Uterus:    Adnexa:    Rectum:     Psych:  Normal mood and affect   Skin:  Without obvious lesions  Nexplanon removal and reinsertion done without problems     Eyes: symmetric, with normal movements and reactivity   Musculoskeletal:  Normal muscle tone and movements appreciated

## 2019-10-25 NOTE — PATIENT INSTRUCTIONS
Etonogestrel (Implant)   Etonogestrel (e-toe-rosemarie-ANAHI-trel)  Prevents pregnancy  Brand Name(s): Nexplanon   There may be other brand names for this medicine  When This Medicine Should Not Be Used: This medicine is not right for everyone  You should not receive it if you had an allergic reaction to etonogestrel, or if you are pregnant  Do not use it if you have breast cancer, heart disease, liver disease, or a history of blood clots (such as deep vein thrombosis, pulmonary embolism, or stroke)  How to Use This Medicine:   Implant  · A nurse or other trained health professional will give you this medicine  · This medicine is an implant  It will be surgically placed under the skin of your upper, inner arm  · Gently press your fingertips over the skin where this medicine was inserted  You should be able to feel the implant  · You might have to use another form of birth control for 7 days after the implant is inserted  Your doctor will tell you if this is needed  · Your doctor can remove the implant at any time if you want to stop using this medicine  The implant must be removed after 3 years, but you may have a new one inserted if you still want to use this form of birth control  · Read and follow the patient instructions that come with this medicine  Talk to your doctor or pharmacist if you have any questions  Drugs and Foods to Avoid:   Ask your doctor or pharmacist before using any other medicine, including over-the-counter medicines, vitamins, and herbal products  · Some foods and medicines can affect how etonogestrel works  Tell your doctor if you are using any of the following:  ¨ Bosentan, carbamazepine, cyclosporine, felbamate, griseofulvin, itraconazole, ketoconazole, lamotrigine, oxcarbazepine, phenobarbital, phenytoin, rifampin, Armando wort, topiramate  ¨ Medicine for HIV/AIDS  Warnings While Using This Medicine:   · Tell your doctor right away if you think you become pregnant   The implant will need to be removed  · Tell your doctor if you have cancer, blood circulation problems, high blood pressure, or kidney disease, or if you smoke  Tell your doctor if you are breastfeeding, or if you have recently given birth  Also tell your doctor if you have diabetes or prediabetes, high cholesterol, or a history of depression  · This medicine may cause the following problems:  ¨ Ectopic (tubal) pregnancy  ¨ Cysts in the ovaries  ¨ Possible risk of breast cancer  ¨ Higher risk of heart attack, stroke, or blood clots  ¨ Liver cancers or tumors  ¨ High blood pressure  · This medicine may change your usual menstrual cycle  You might have irregular bleeding, or your periods may be lighter, shorter, heavier, or longer  You might not have a period in some cycles  However, call your doctor if you think you are pregnant or if you have severe pain or changes that worry you  · This medicine will not protect you from HIV/AIDS or other sexually transmitted diseases  · You might need to have the implant removed if you will be inactive for a period of time, such as after major surgery, because of the risk of blood clots  · Tell any doctor or dentist who treats you that you are using this medicine  This medicine may affect certain medical test results  · Your doctor will check your progress and the effects of this medicine at regular visits  Keep all appointments  · Keep all medicine out of the reach of children  Never share your medicine with anyone    Possible Side Effects While Using This Medicine:   Call your doctor right away if you notice any of these side effects:  · Allergic reaction: Itching or hives, swelling in your face or hands, swelling or tingling in your mouth or throat, chest tightness, trouble breathing  · Chest pain, trouble breathing, or coughing up blood  · Dark urine or pale stools, nausea, vomiting, loss of appetite, stomach pain, yellow skin or eyes  · Double vision or other trouble seeing  · Numbness or weakness on one side of your body, sudden or severe headache, problems with vision, speech, or walking  · Pain in your lower leg (calf)  · Severe or ongoing pain, tingling, bleeding, bruising, redness, itching, or swelling where the implant is placed  · Unusual or severe pain in your abdomen  · Unusual or unexpected vaginal bleeding or heavy bleeding  If you notice these less serious side effects, talk with your doctor:   · Acne or pimples  · Mild headache  · Mild pain, tingling, bleeding, bruising, redness, itching, or swelling where the implant is placed  · Mood changes  · Weight gain  If you notice other side effects that you think are caused by this medicine, tell your doctor  Call your doctor for medical advice about side effects  You may report side effects to FDA at 6-283-FDA-0740  © 2017 [employer] Information is for End User's use only and may not be sold, redistributed or otherwise used for commercial purposes  The above information is an  only  It is not intended as medical advice for individual conditions or treatments  Talk to your doctor, nurse or pharmacist before following any medical regimen to see if it is safe and effective for you

## 2019-11-27 ENCOUNTER — ANNUAL EXAM (OUTPATIENT)
Dept: OBGYN CLINIC | Facility: CLINIC | Age: 24
End: 2019-11-27
Payer: COMMERCIAL

## 2019-11-27 VITALS
WEIGHT: 225 LBS | SYSTOLIC BLOOD PRESSURE: 118 MMHG | HEIGHT: 63 IN | BODY MASS INDEX: 39.87 KG/M2 | DIASTOLIC BLOOD PRESSURE: 84 MMHG

## 2019-11-27 DIAGNOSIS — Z97.5 NEXPLANON IN PLACE: ICD-10-CM

## 2019-11-27 DIAGNOSIS — Z11.51 SCREENING FOR HPV (HUMAN PAPILLOMAVIRUS): ICD-10-CM

## 2019-11-27 DIAGNOSIS — Z01.419 WOMEN'S ANNUAL ROUTINE GYNECOLOGICAL EXAMINATION: ICD-10-CM

## 2019-11-27 DIAGNOSIS — Z12.4 SCREENING FOR CERVICAL CANCER: Primary | ICD-10-CM

## 2019-11-27 PROCEDURE — G0145 SCR C/V CYTO,THINLAYER,RESCR: HCPCS | Performed by: OBSTETRICS & GYNECOLOGY

## 2019-11-27 PROCEDURE — 99395 PREV VISIT EST AGE 18-39: CPT | Performed by: OBSTETRICS & GYNECOLOGY

## 2019-11-27 NOTE — PATIENT INSTRUCTIONS
Etonogestrel (Implant)   Etonogestrel (e-toe-rosemarie-ANAHI-trel)  Prevents pregnancy  Brand Name(s): Nexplanon   There may be other brand names for this medicine  When This Medicine Should Not Be Used: This medicine is not right for everyone  You should not receive it if you had an allergic reaction to etonogestrel, or if you are pregnant  Do not use it if you have breast cancer, heart disease, liver disease, or a history of blood clots (such as deep vein thrombosis, pulmonary embolism, or stroke)  How to Use This Medicine:   Implant  · A nurse or other trained health professional will give you this medicine  · This medicine is an implant  It will be surgically placed under the skin of your upper, inner arm  · Gently press your fingertips over the skin where this medicine was inserted  You should be able to feel the implant  · You might have to use another form of birth control for 7 days after the implant is inserted  Your doctor will tell you if this is needed  · Your doctor can remove the implant at any time if you want to stop using this medicine  The implant must be removed after 3 years, but you may have a new one inserted if you still want to use this form of birth control  · Read and follow the patient instructions that come with this medicine  Talk to your doctor or pharmacist if you have any questions  Drugs and Foods to Avoid:   Ask your doctor or pharmacist before using any other medicine, including over-the-counter medicines, vitamins, and herbal products  · Some foods and medicines can affect how etonogestrel works  Tell your doctor if you are using any of the following:  ¨ Bosentan, carbamazepine, cyclosporine, felbamate, griseofulvin, itraconazole, ketoconazole, lamotrigine, oxcarbazepine, phenobarbital, phenytoin, rifampin, Armando wort, topiramate  ¨ Medicine for HIV/AIDS  Warnings While Using This Medicine:   · Tell your doctor right away if you think you become pregnant   The implant will need to be removed  · Tell your doctor if you have cancer, blood circulation problems, high blood pressure, or kidney disease, or if you smoke  Tell your doctor if you are breastfeeding, or if you have recently given birth  Also tell your doctor if you have diabetes or prediabetes, high cholesterol, or a history of depression  · This medicine may cause the following problems:  ¨ Ectopic (tubal) pregnancy  ¨ Cysts in the ovaries  ¨ Possible risk of breast cancer  ¨ Higher risk of heart attack, stroke, or blood clots  ¨ Liver cancers or tumors  ¨ High blood pressure  · This medicine may change your usual menstrual cycle  You might have irregular bleeding, or your periods may be lighter, shorter, heavier, or longer  You might not have a period in some cycles  However, call your doctor if you think you are pregnant or if you have severe pain or changes that worry you  · This medicine will not protect you from HIV/AIDS or other sexually transmitted diseases  · You might need to have the implant removed if you will be inactive for a period of time, such as after major surgery, because of the risk of blood clots  · Tell any doctor or dentist who treats you that you are using this medicine  This medicine may affect certain medical test results  · Your doctor will check your progress and the effects of this medicine at regular visits  Keep all appointments  · Keep all medicine out of the reach of children  Never share your medicine with anyone    Possible Side Effects While Using This Medicine:   Call your doctor right away if you notice any of these side effects:  · Allergic reaction: Itching or hives, swelling in your face or hands, swelling or tingling in your mouth or throat, chest tightness, trouble breathing  · Chest pain, trouble breathing, or coughing up blood  · Dark urine or pale stools, nausea, vomiting, loss of appetite, stomach pain, yellow skin or eyes  · Double vision or other trouble seeing  · Numbness or weakness on one side of your body, sudden or severe headache, problems with vision, speech, or walking  · Pain in your lower leg (calf)  · Severe or ongoing pain, tingling, bleeding, bruising, redness, itching, or swelling where the implant is placed  · Unusual or severe pain in your abdomen  · Unusual or unexpected vaginal bleeding or heavy bleeding  If you notice these less serious side effects, talk with your doctor:   · Acne or pimples  · Mild headache  · Mild pain, tingling, bleeding, bruising, redness, itching, or swelling where the implant is placed  · Mood changes  · Weight gain  If you notice other side effects that you think are caused by this medicine, tell your doctor  Call your doctor for medical advice about side effects  You may report side effects to FDA at 0-000-FDA-9865  © 2017 [employer] Information is for End User's use only and may not be sold, redistributed or otherwise used for commercial purposes  The above information is an  only  It is not intended as medical advice for individual conditions or treatments  Talk to your doctor, nurse or pharmacist before following any medical regimen to see if it is safe and effective for you

## 2019-11-27 NOTE — PROGRESS NOTES
Assessment/Plan   Diagnoses and all orders for this visit:    Screening for cervical cancer  -     Liquid-based pap, screening    Screening for HPV (human papillomavirus)  -     Liquid-based pap, screening    Women's annual routine gynecological examination    Nexplanon in place     1  Yearly exam-Pap smear done with reflex HPV, self-breast awareness reviewed  2  Nexplanon-in good position in the left arm  Her incision sites are clean, dry, intact  She will call with any issues  3  Irregular bleeding-patient has noted some erratic bleeding over the past months  She did note increased spotting with the new device  She will call with any menometrorrhagia  Previously, she would get irregular bleeding throughout the whole 3 year course of the 1st Nexplanon  4  Previous Pap with low-grade RANGEL-noted 2016  ECC was negative 2016  Pap 2017 was negative with negative GC/chlamydia  Pap was negative May 2018  Patient requested repeat Pap and was done today with reflex HPV  5  Previous BV/yeast/STD concerns-none noted at this time  Follow-up 1 year for yearly exam or as needed  Subjective   Patient ID: Sophie Camp is a 30 y o  female  Vitals:    19 1218   BP: 118/84     Patient was seen today for yearly exam   Please see assessment plan for details  The following portions of the patient's history were reviewed and updated as appropriate: allergies, current medications, past family history, past medical history, past social history, past surgical history and problem list   Past Medical History:   Diagnosis Date    Abnormal Pap smear of cervix     HPV (human papilloma virus) infection      History reviewed  No pertinent surgical history    OB History    Para Term  AB Living   0 0 0 0 0 0   SAB TAB Ectopic Multiple Live Births   0 0 0 0 0       Current Outpatient Medications:     Etonogestrel (NEXPLANON) 76 MG IMPL, Inject under the skin, Disp: , Rfl:   Allergies Allergen Reactions    Other      Social History     Socioeconomic History    Marital status: Single     Spouse name: None    Number of children: None    Years of education: None    Highest education level: None   Occupational History    None   Social Needs    Financial resource strain: None    Food insecurity:     Worry: None     Inability: None    Transportation needs:     Medical: None     Non-medical: None   Tobacco Use    Smoking status: Never Smoker    Smokeless tobacco: Never Used   Substance and Sexual Activity    Alcohol use: Yes     Comment: social     Drug use: No    Sexual activity: Not Currently     Birth control/protection: Implant   Lifestyle    Physical activity:     Days per week: None     Minutes per session: None    Stress: None   Relationships    Social connections:     Talks on phone: None     Gets together: None     Attends Zoroastrian service: None     Active member of club or organization: None     Attends meetings of clubs or organizations: None     Relationship status: None    Intimate partner violence:     Fear of current or ex partner: None     Emotionally abused: None     Physically abused: None     Forced sexual activity: None   Other Topics Concern    None   Social History Narrative    None     Family History   Problem Relation Age of Onset    No Known Problems Mother     Heart disease Father        Review of Systems   Constitutional: Negative for chills, diaphoresis, fatigue and fever  Respiratory: Negative for apnea, cough, chest tightness, shortness of breath and wheezing  Cardiovascular: Negative for chest pain, palpitations and leg swelling  Gastrointestinal: Negative for abdominal distention, abdominal pain, anal bleeding, constipation, diarrhea, nausea, rectal pain and vomiting     Genitourinary: Negative for difficulty urinating, dyspareunia, dysuria, frequency, hematuria, menstrual problem, pelvic pain, urgency, vaginal bleeding, vaginal discharge and vaginal pain  Musculoskeletal: Negative for arthralgias, back pain and myalgias  Skin: Negative for color change and rash  Neurological: Negative for dizziness, syncope, light-headedness, numbness and headaches  Hematological: Negative for adenopathy  Does not bruise/bleed easily  Psychiatric/Behavioral: Negative for dysphoric mood and sleep disturbance  The patient is not nervous/anxious  Objective   Physical Exam    Objective      /84 (BP Location: Left arm, Patient Position: Sitting, Cuff Size: Standard)   Ht 5' 3" (1 6 m)   Wt 102 kg (225 lb)   BMI 39 86 kg/m²     General:   alert and oriented, in no acute distress   Neck: normal to inspection and palpation   Breast: normal appearance, no masses or tenderness  Nipple rings in good position without issues  Heart:    Lungs:    Abdomen: soft, non-tender, without masses or organomegaly   Vulva: normal, clitoral ring and labial rings without evidence of infection   Vagina: Without erythema or lesions or discharge  Normal   Cervix: Without lesions or discharge or cervicitis  No Cervical motion tenderness    Scant amount of menstrual blood noted   Uterus: normal size, anteverted, non-tender   Adnexa: no mass, fullness, tenderness   Rectum: negative    Psych:  Normal mood and affect   Skin:  Without obvious lesions   Eyes: symmetric, with normal movements and reactivity   Musculoskeletal:  Normal muscle tone and movements appreciated

## 2019-12-03 LAB
LAB AP GYN PRIMARY INTERPRETATION: NORMAL
Lab: NORMAL
PATH INTERP SPEC-IMP: NORMAL